# Patient Record
Sex: MALE | Race: WHITE | Employment: FULL TIME | URBAN - METROPOLITAN AREA
[De-identification: names, ages, dates, MRNs, and addresses within clinical notes are randomized per-mention and may not be internally consistent; named-entity substitution may affect disease eponyms.]

---

## 2022-03-24 ENCOUNTER — HOSPITAL ENCOUNTER (EMERGENCY)
Age: 27
Discharge: HOME | End: 2022-03-24
Payer: COMMERCIAL

## 2022-03-24 ENCOUNTER — APPOINTMENT (OUTPATIENT)
Dept: GENERAL RADIOLOGY | Age: 27
End: 2022-03-24
Payer: COMMERCIAL

## 2022-03-24 VITALS
DIASTOLIC BLOOD PRESSURE: 69 MMHG | SYSTOLIC BLOOD PRESSURE: 125 MMHG | TEMPERATURE: 97 F | WEIGHT: 123 LBS | BODY MASS INDEX: 21 KG/M2 | HEIGHT: 64 IN | HEART RATE: 72 BPM | OXYGEN SATURATION: 98 % | RESPIRATION RATE: 20 BRPM

## 2022-03-24 DIAGNOSIS — S39.012A STRAIN OF LUMBAR REGION, INITIAL ENCOUNTER: Primary | ICD-10-CM

## 2022-03-24 LAB
AMORPHOUS: ABNORMAL
BACTERIA: ABNORMAL /HPF
BILIRUBIN URINE: NEGATIVE
BLOOD, URINE: NEGATIVE
CLARITY: ABNORMAL
COLOR: YELLOW
EPITHELIAL CELLS, UA: ABNORMAL /HPF
GLUCOSE URINE: NEGATIVE MG/DL
HCG, URINE, POC: NEGATIVE
KETONES, URINE: NEGATIVE MG/DL
LEUKOCYTE ESTERASE, URINE: NEGATIVE
Lab: NORMAL
NEGATIVE QC PASS/FAIL: NORMAL
NITRITE, URINE: NEGATIVE
PH UA: 8 (ref 5–9)
POSITIVE QC PASS/FAIL: NORMAL
PROTEIN UA: NEGATIVE MG/DL
RBC UA: ABNORMAL /HPF (ref 0–2)
SPECIFIC GRAVITY UA: 1.02 (ref 1–1.03)
UROBILINOGEN, URINE: 0.2 E.U./DL
WBC UA: ABNORMAL /HPF (ref 0–5)

## 2022-03-24 PROCEDURE — 81001 URINALYSIS AUTO W/SCOPE: CPT

## 2022-03-24 PROCEDURE — 6360000002 HC RX W HCPCS: Performed by: NURSE PRACTITIONER

## 2022-03-24 PROCEDURE — 72100 X-RAY EXAM L-S SPINE 2/3 VWS: CPT

## 2022-03-24 PROCEDURE — 99283 EMERGENCY DEPT VISIT LOW MDM: CPT

## 2022-03-24 PROCEDURE — 96372 THER/PROPH/DIAG INJ SC/IM: CPT

## 2022-03-24 PROCEDURE — 6370000000 HC RX 637 (ALT 250 FOR IP): Performed by: NURSE PRACTITIONER

## 2022-03-24 RX ORDER — LIDOCAINE 4 G/G
1 PATCH TOPICAL DAILY
Status: DISCONTINUED | OUTPATIENT
Start: 2022-03-24 | End: 2022-03-24 | Stop reason: HOSPADM

## 2022-03-24 RX ORDER — KETOROLAC TROMETHAMINE 30 MG/ML
30 INJECTION, SOLUTION INTRAMUSCULAR; INTRAVENOUS ONCE
Status: COMPLETED | OUTPATIENT
Start: 2022-03-24 | End: 2022-03-24

## 2022-03-24 RX ORDER — LIDOCAINE 4 G/G
1 PATCH TOPICAL DAILY
Qty: 5 PATCH | Refills: 0 | Status: SHIPPED | OUTPATIENT
Start: 2022-03-24 | End: 2022-03-29

## 2022-03-24 RX ORDER — NAPROXEN 500 MG/1
500 TABLET ORAL 2 TIMES DAILY WITH MEALS
Qty: 14 TABLET | Refills: 0 | Status: SHIPPED | OUTPATIENT
Start: 2022-03-24 | End: 2022-03-31

## 2022-03-24 RX ORDER — PREDNISONE 20 MG/1
40 TABLET ORAL ONCE
Status: COMPLETED | OUTPATIENT
Start: 2022-03-24 | End: 2022-03-24

## 2022-03-24 RX ADMIN — PREDNISONE 40 MG: 20 TABLET ORAL at 14:19

## 2022-03-24 RX ADMIN — KETOROLAC TROMETHAMINE 30 MG: 30 INJECTION, SOLUTION INTRAMUSCULAR; INTRAVENOUS at 14:20

## 2022-03-24 ASSESSMENT — PAIN SCALES - GENERAL: PAINLEVEL_OUTOF10: 10

## 2022-03-24 NOTE — Clinical Note
Neal Perdue was seen and treated in our emergency department on 3/24/2022. He may return to work on 03/25/2022. If you have any questions or concerns, please don't hesitate to call.       Jude Carter, ASHLI - CNP

## 2022-03-24 NOTE — Clinical Note
Ab Maki was seen and treated in our emergency department on 3/24/2022. He may return to work on 03/25/2022. If you have any questions or concerns, please don't hesitate to call.       Nhan Hamilton, ASHLI - CNP

## 2022-03-24 NOTE — ED PROVIDER NOTES
[facility]  ED Provider Note    HPI: Pily Mancera 23 y. o.adult with   Past Medical History:   Diagnosis Date    Depression     anxiety, anger   who presents with a right sided lumbar paraspinal back pain. The patient states that the back pain began 1-2 weeks ago. The history is obtained from the patient. The patient states that he did not have any injury, trauma, or fall specifically. Reports he may have twisted it. The patient states that the pain is mild to moderate. It is worsened by movement including flexion and extension of the back as well as rotation. Patient denies any distal neurovascular complaints including numbness tingling or weakness. There are no bowel or bladder symptoms reported. Denies urinary incontinence, urinary retention, hematuria, fecal incontinence, dysuria. States that he has had some urinary frequency but is unsure if that is related. The patient denies saddle or groin anesthesia. The patient also denies fevers, chills, weight loss, night sweats, IV drug use, or recent illness. Patient is ambulatory. Off note, patient is transgender male. Review of systems: All systems were reviewed and are negative except as noted in the HPI     Nursing Notes Reviewed  Vitals Signs Reviewed  /69   Pulse 72   Temp 97 °F (36.1 °C) (Oral)   Resp 20   Ht 5' 4\" (1.626 m)   Wt 123 lb (55.8 kg)   LMP  (LMP Unknown)   SpO2 98%   BMI 21.11 kg/m²         Physical exam:  Constitutional:  The patient is comfortable, well appearing, non toxic in NAD. Patient is alert and oriented x3. Pleasant. Head: Head is atraumatic and normocephalic. Eyes: PERRLA, There is no discharge from the eyes. Sclerae are normal.  ENT: The oropharynx is normal. The mouth is normal to inspection. Neck: Normal range of motion of the neck is present there is no JVD present no meningeal signs are present. There is no tenderness to palpation over cervical spine or paraspinal muscles.   Respiratory/chest: The chest is nontender. Lungs clear to auscultation bilaterally. Respirations easy and unlabored. Cardiovascular: S1S2, regular rate and rhythm is noted. No murmurs. No rubs or gallops. Skin: Skin is warm and dry. No eccymosis, lesions, open wounds, or joint swelling. No lacerations or abrasions. Neurologic exam: GCS 15. No focal motor deficits. There are no focal sensory deficits. Deep tendon reflexes are intact and present bilaterally in the lower extremities. Babinski is absent. Gait is normal. Symmetric strength and sensation in the lower extremities bilaterally. Integumentary: Warm and well perfused. There is no evidence of localized erythema nor is there any focal warmth to the back. No ecchymosis. No abrasions, wounds, or lacerations. Musculoskeletal: Moves all extremities x 4. Warm and well perfused. Cap refill < 3 seconds. Reproducible tenderness to palpation in the paravertebral right sided lumbar paraspinal region. There is no evidence of localized erythema nor is there any focal warmth to the back. The patient has no evidence of single vertebral tenderness or any single area of interspace tenderness. There are no palpable deformities or stepoffs. No midline cervical, thoracic, or lumbar spine tenderness. No cervical or thoracic spine or paraspinal pain. Ambulatory, gait steady. Medical decision making:  Patient presenting to the ED today for lumbar spine paraspinal muscle back pain. Imaging obtained which is negative for any acute findings. UA normal. Patient medicated with Toradol, prednisone, lidocaine patch while in emergency department. No red flag warning symptoms currently. Patient will be treated for mechanical lumbar strain without evidence of fracture or neurologic compromise. Patient educated to take tylenol/motrin for pain relief if needed. Patient can proceed with activity as tolerated.  Patient educated to return to ED with any worsening of symptoms or new symptoms such as numbness, tingling, extreme pain, or loss of bowel/bladder function. Patient will follow up with PCP for re-evaluation.       Plan:  Review of past medical records for appropriate pain control and outpatient referral.        Impression:  Lumbar strain    Disposition:  Discharge    Condition:  stable     ASHLI Trammell - CNP  03/24/22 1471

## 2024-04-07 ENCOUNTER — APPOINTMENT (OUTPATIENT)
Dept: CT IMAGING | Age: 29
End: 2024-04-07

## 2024-04-07 ENCOUNTER — HOSPITAL ENCOUNTER (EMERGENCY)
Age: 29
Discharge: HOME | End: 2024-04-07

## 2024-04-07 ENCOUNTER — APPOINTMENT (OUTPATIENT)
Dept: GENERAL RADIOLOGY | Age: 29
End: 2024-04-07

## 2024-04-07 VITALS
RESPIRATION RATE: 14 BRPM | TEMPERATURE: 98.3 F | HEART RATE: 112 BPM | OXYGEN SATURATION: 98 % | DIASTOLIC BLOOD PRESSURE: 75 MMHG | SYSTOLIC BLOOD PRESSURE: 151 MMHG

## 2024-04-07 DIAGNOSIS — M54.12 CERVICAL RADICULOPATHY: ICD-10-CM

## 2024-04-07 DIAGNOSIS — M25.511 ACUTE PAIN OF RIGHT SHOULDER: Primary | ICD-10-CM

## 2024-04-07 PROCEDURE — 73030 X-RAY EXAM OF SHOULDER: CPT

## 2024-04-07 PROCEDURE — 99284 EMERGENCY DEPT VISIT MOD MDM: CPT

## 2024-04-07 PROCEDURE — 72125 CT NECK SPINE W/O DYE: CPT

## 2024-04-07 PROCEDURE — 6360000002 HC RX W HCPCS: Performed by: PHYSICIAN ASSISTANT

## 2024-04-07 RX ORDER — LIDOCAINE 50 MG/G
1 PATCH TOPICAL EVERY 24 HOURS
Qty: 10 PATCH | Refills: 0 | Status: SHIPPED | OUTPATIENT
Start: 2024-04-07 | End: 2024-04-17

## 2024-04-07 RX ORDER — NAPROXEN 500 MG/1
500 TABLET ORAL 2 TIMES DAILY
Qty: 14 TABLET | Refills: 0 | Status: SHIPPED | OUTPATIENT
Start: 2024-04-07 | End: 2024-04-14

## 2024-04-07 RX ORDER — PREDNISONE 10 MG/1
40 TABLET ORAL DAILY
Qty: 20 TABLET | Refills: 0 | Status: SHIPPED | OUTPATIENT
Start: 2024-04-07 | End: 2024-04-12

## 2024-04-07 RX ORDER — DEXAMETHASONE SODIUM PHOSPHATE 10 MG/ML
10 INJECTION INTRAMUSCULAR; INTRAVENOUS ONCE
Status: COMPLETED | OUTPATIENT
Start: 2024-04-07 | End: 2024-04-07

## 2024-04-07 RX ORDER — IBUPROFEN 600 MG/1
600 TABLET ORAL ONCE
Status: DISCONTINUED | OUTPATIENT
Start: 2024-04-07 | End: 2024-04-07 | Stop reason: HOSPADM

## 2024-04-07 RX ADMIN — DEXAMETHASONE SODIUM PHOSPHATE 10 MG: 10 INJECTION INTRAMUSCULAR; INTRAVENOUS at 13:57

## 2024-04-07 ASSESSMENT — PAIN SCALES - GENERAL: PAINLEVEL_OUTOF10: 5

## 2024-04-07 ASSESSMENT — PAIN - FUNCTIONAL ASSESSMENT: PAIN_FUNCTIONAL_ASSESSMENT: 0-10

## 2024-04-07 NOTE — ED PROVIDER NOTES
conditions    Past Medical History:   Diagnosis Date    Depression     anxiety, anger   .    Physical exam   Constitutional/General: Alert and oriented x3, well appearing, non toxic in NAD  Head: Normocephalic and atraumatic  Eyes: PERRL, EOMI  Mouth: Oropharynx clear, handling secretions, no trismus  Neck: Supple, full ROM,   Pulmonary: Lungs clear to auscultation bilaterally, no wheezes, rales, or rhonchi. Not in respiratory distress  Cardiovascular:  Regular rate and rhythm, no murmurs, gallops, or rubs. 2+ distal pulses  Abdomen: Soft, non tender, non distended,   Extremities: Moves all extremities x 4. Warm and well perfused.  Tenderness of the right scapular area, right posterior lateral neck.  No bony point tenderness of the upper extremity elbow forearm hand.  Pulses normal.  Cap refill less than 2 seconds.  Normal sensation  Skin: warm and dry without rash  Neurologic: GCS 15,  Psych: Anxious affect.  Vital signs /75Temp   98.3 °F    (36.8 °C)  Pulse 112Respirations 14SpO2 98%Weight - Scale --Ht --BMI 21.11 kg/m²Pain Level 5 .  Differential diagnoses include but not limited to right shoulder strain, cervical radiculopathy.  Bone lesion. Diagnostic studies revealed CT cervical no acute fracture or subluxation.  Shoulder no evidence of shoulder fracture or dislocation. Consults included none. Results were discussed with patient.  Patient was given Motrin 600 mg p.o. x 1, Decadron 10 mg p.o. x 1 for their symptoms with mild improvement. Patient will be discharged home with the following prescriptions, prednisone burst, Lidoderm patch..  Discussed appropriate use and potential side effects of starting the prescribed medications. Patient continues to be non-toxic on re-evaluation. Findings were discussed with the patient and reasons to immediately return to the ED were articulated to them. They will follow-up with their PMD .      Discharge Instructions:   Patient referred to  [facility]